# Patient Record
Sex: MALE | Race: WHITE | NOT HISPANIC OR LATINO | Employment: OTHER | URBAN - NONMETROPOLITAN AREA
[De-identification: names, ages, dates, MRNs, and addresses within clinical notes are randomized per-mention and may not be internally consistent; named-entity substitution may affect disease eponyms.]

---

## 2024-07-12 ENCOUNTER — OFFICE VISIT (OUTPATIENT)
Dept: UROLOGY | Facility: CLINIC | Age: 79
End: 2024-07-12
Payer: MEDICARE

## 2024-07-12 VITALS
DIASTOLIC BLOOD PRESSURE: 80 MMHG | WEIGHT: 220 LBS | HEART RATE: 99 BPM | OXYGEN SATURATION: 96 % | BODY MASS INDEX: 31.5 KG/M2 | HEIGHT: 70 IN | SYSTOLIC BLOOD PRESSURE: 110 MMHG

## 2024-07-12 DIAGNOSIS — R31.9 HEMATURIA, UNSPECIFIED TYPE: ICD-10-CM

## 2024-07-12 DIAGNOSIS — R39.9 LOWER URINARY TRACT SYMPTOMS (LUTS): Primary | ICD-10-CM

## 2024-07-12 LAB
BILIRUB BLD-MCNC: NEGATIVE MG/DL
CLARITY, POC: CLEAR
COLOR UR: ABNORMAL
EXPIRATION DATE: ABNORMAL
GLUCOSE UR STRIP-MCNC: NEGATIVE MG/DL
KETONES UR QL: NEGATIVE
LEUKOCYTE EST, POC: NEGATIVE
Lab: ABNORMAL
NITRITE UR-MCNC: NEGATIVE MG/ML
PH UR: 6.5 [PH] (ref 5–8)
PROT UR STRIP-MCNC: NEGATIVE MG/DL
RBC # UR STRIP: ABNORMAL /UL
SP GR UR: 1.02 (ref 1–1.03)
UROBILINOGEN UR QL: ABNORMAL

## 2024-07-12 RX ORDER — HYDROCHLOROTHIAZIDE 25 MG/1
TABLET ORAL
COMMUNITY

## 2024-07-12 RX ORDER — TAMSULOSIN HYDROCHLORIDE 0.4 MG/1
CAPSULE ORAL
COMMUNITY
Start: 2024-06-17

## 2024-07-12 RX ORDER — AMMONIUM LACTATE 12 G/100G
CREAM TOPICAL
COMMUNITY

## 2024-07-12 RX ORDER — ONDANSETRON 4 MG/1
TABLET, FILM COATED ORAL
COMMUNITY

## 2024-07-12 RX ORDER — FINASTERIDE 5 MG/1
TABLET, FILM COATED ORAL
COMMUNITY
Start: 2024-06-17

## 2024-07-12 RX ORDER — CETIRIZINE HYDROCHLORIDE 10 MG/1
TABLET ORAL
COMMUNITY
Start: 2024-06-17

## 2024-07-12 RX ORDER — METFORMIN HYDROCHLORIDE 500 MG/1
TABLET, EXTENDED RELEASE ORAL
COMMUNITY
Start: 2024-06-17

## 2024-07-12 RX ORDER — POLYETHYLENE GLYCOL 3350, SODIUM CHLORIDE, SODIUM BICARBONATE, POTASSIUM CHLORIDE 420; 11.2; 5.72; 1.48 G/4L; G/4L; G/4L; G/4L
POWDER, FOR SOLUTION ORAL
COMMUNITY

## 2024-07-12 RX ORDER — ASPIRIN 81 MG/1
81 TABLET ORAL
COMMUNITY

## 2024-07-12 RX ORDER — AMLODIPINE BESYLATE 10 MG/1
TABLET ORAL
COMMUNITY

## 2024-07-12 RX ORDER — LISINOPRIL 40 MG/1
TABLET ORAL
COMMUNITY

## 2024-07-12 RX ORDER — PRAVASTATIN SODIUM 10 MG
TABLET ORAL
COMMUNITY

## 2024-07-12 RX ORDER — AMOXICILLIN 500 MG/1
CAPSULE ORAL
COMMUNITY
End: 2024-07-12

## 2024-07-12 RX ORDER — MELATONIN
COMMUNITY
Start: 2024-06-05

## 2024-07-12 RX ORDER — POTASSIUM CHLORIDE 750 MG/1
TABLET, FILM COATED, EXTENDED RELEASE ORAL
COMMUNITY

## 2024-07-12 RX ORDER — METRONIDAZOLE 500 MG/1
TABLET ORAL
COMMUNITY
End: 2024-07-12

## 2024-07-12 RX ORDER — ATENOLOL 25 MG/1
TABLET ORAL
COMMUNITY

## 2024-07-12 RX ORDER — POLYMYXIN B SULFATE AND TRIMETHOPRIM 1; 10000 MG/ML; [USP'U]/ML
SOLUTION OPHTHALMIC
COMMUNITY
End: 2024-07-12

## 2024-07-12 RX ORDER — NAPROXEN 500 MG/1
TABLET ORAL
COMMUNITY

## 2024-07-12 NOTE — PATIENT INSTRUCTIONS
Recommended BPH workup.  Which includes cystoscopy to evaluate degree of prostate obstruction and bladder health.  TRUS (transrectal ultrasound) to measure the size of the prostate, and uroflow to evaluate stream strength.

## 2024-07-12 NOTE — PROGRESS NOTES
Office Visit Males LUTS      Patient Name: Ariel Wilkerson  : 1945   MRN: [id number]     Chief Complaint:   Chief Complaint   Patient presents with    [facility]     BPH W/LUTS       Referring Provider: Rodolfo Sharma DO    History of Present Illness: Ariel Wilkerson is a 79 y.o. male who presents today with history of chronic back pain, diabetes, gross hematuria and BPH with his most bothersome complaints being incomplete emptying, urgency, and nocturia.  He was previously treated by Dr. Sharma with recommendation to undergo TURP.  He is currently taking Flomax and Finasteride.  Most recent PSA in 2023 was 0.7 ng/mL, adjusted for finasteride use 1.4 ng/mL.  He is accompanied by his daughter today who is very supportive.  He reports that he cares for his wife with dementia. He is overall unhappy with his urinary symptoms and would be interested in proceeding with TURP if he is found to be a candidate.      IPSS Questionnaire (AUA-7):  Incomplete emptying  Over the past month, how often have you had a sensation of not emptying your bladder completely after you finished urinating?: Almost always (24)  Frequency  Over the past month, how often have you had to urinate again less than two hours after you finishied urinating ?: More than half the time (24)  Intermittency  Over the past month, how often have you found you stopped and started again several time when you urinated ?: Not at all (24)  Urgency  Over the last month, how often have you found it difficult  have you found it difficult to postpone urination ?: Almost always (24)  Weak Stream  Over the past month, how often have you had a weak urinary stream ?: About half the time (24)  Straining  Over the past month, how often have you had to push or strain to begin urination ?: Not at all (24)  Nocturia  Over the past month, how many times did you most typically get up to  urinate from the time you went to bed until the time you got up in the morning ?: At least 5 times (24)  Quality of life due to urinary symptoms  If you were to spend the rest of your life with your urinary condition the way it is now, how would feel about that?: Terrible (24)    Scores  Total IPSS Score: (!) 22 (24)  Total Score = Symptomatic Level: Severely symptomatic: 20-35 (24)       Subjective      Review of System: ROS reviewed by me and is negative unless otherwise noted in HPI.        Past Medical History:   Past Medical History:   Diagnosis Date    BPH (benign prostatic hyperplasia)     Gross hematuria        Past Surgical History:   Past Surgical History:   Procedure Laterality Date    CHOLECYSTECTOMY      HERNIA REPAIR         Family History:   Family History   Problem Relation Age of Onset    Heart disease Father     WILLIAM disease Mother     Other Brother         agent orange    Other Brother         MVA    Other Brother         unknown illness       Social History:   Social History     Socioeconomic History    Marital status:    Tobacco Use    Smoking status: Former     Current packs/day: 0.00     Average packs/day: 1 pack/day for 13.0 years (13.0 ttl pk-yrs)     Types: Cigarettes     Start date:      Quit date:      Years since quittin.5     Passive exposure: Past    Smokeless tobacco: Former     Types: Chew    Tobacco comments:     2 packs per week from  to    Vaping Use    Vaping status: Never Used   Substance and Sexual Activity    Alcohol use: Not Currently    Drug use: Never    Sexual activity: Not Currently     Partners: Female       Medications:     Current Outpatient Medications:     amLODIPine (NORVASC) 10 MG tablet, , Disp: , Rfl:     ammonium lactate (AMLACTIN) 12 % cream, , Disp: , Rfl:     aspirin 81 MG EC tablet, Take 1 tablet by mouth., Disp: , Rfl:     atenolol (TENORMIN) 25 MG tablet, , Disp: , Rfl:     cetirizine  "(zyrTEC) 10 MG tablet, , Disp: , Rfl:     Cholecalciferol 25 MCG (1000 UT) tablet, , Disp: , Rfl:     finasteride (PROSCAR) 5 MG tablet, , Disp: , Rfl:     hydroCHLOROthiazide 25 MG tablet, , Disp: , Rfl:     KLOR-CON 10 MEQ CR tablet, , Disp: , Rfl:     lisinopril (PRINIVIL,ZESTRIL) 40 MG tablet, , Disp: , Rfl:     metFORMIN ER (GLUCOPHAGE-XR) 500 MG 24 hr tablet, , Disp: , Rfl:     naproxen (NAPROSYN) 500 MG tablet, , Disp: , Rfl:     ondansetron (ZOFRAN) 4 MG tablet, , Disp: , Rfl:     polyethylene glycol-electrolytes (NULYTELY) 420 g solution, , Disp: , Rfl:     pravastatin (PRAVACHOL) 10 MG tablet, , Disp: , Rfl:     tamsulosin (FLOMAX) 0.4 MG capsule 24 hr capsule, , Disp: , Rfl:     Allergies:   Allergies   Allergen Reactions    Iodinated Contrast Media Hives    Nifedipine Hives and Rash       Objective     Physical Exam:   Vital Signs:   Vitals:    07/12/24 1402   BP: 110/80   Pulse: 99   SpO2: 96%   Weight: 99.8 kg (220 lb)   Height: 177.8 cm (70\")     Body mass index is 31.57 kg/m².   Physical Exam  Vitals and nursing note reviewed.   Constitutional:       General: He is not in acute distress.     Appearance: Normal appearance. He is ill-appearing (chronically ill appearing).   HENT:      Head: Normocephalic and atraumatic.   Pulmonary:      Effort: Pulmonary effort is normal.   Skin:     General: Skin is warm and dry.   Neurological:      General: No focal deficit present.      Mental Status: He is alert and oriented to person, place, and time.      Gait: Gait abnormal (ambulates with cane, slow position changes).   Psychiatric:         Mood and Affect: Mood normal.         Behavior: Behavior normal.          Labs  No results found for: \"PSA\"    Brief Urine Lab Results  (Last result in the past 365 days)        Color   Clarity   Blood   Leuk Est   Nitrite   Protein   CREAT   Urine HCG        07/12/24 1411 Rosmery   Clear   Trace   Negative   Negative   Negative                   PVR  Post-void residual " performed by staff - 18 ml.       Assessment / Plan      Assessment    Ariel Wilkerson is a 79 y.o. male who presents today with history of chronic back pain, diabetes, gross hematuria and BPH with his most bothersome complaints being incomplete emptying, urgency, and nocturia.  He was previously treated by Dr. Sharma with recommendation to undergo TURP.  He is currently taking Flomax and Finasteride.  Most recent PSA in June of 2023 was 0.7 ng/mL, adjusted for finasteride use 1.4 ng/mL.      We discussed the BPH work up with both the patient and his daughter.  Mr. Wilkerson is agreeable to undergo cystoscopy, TRUS, and uroflow as discussed.  PSA was within normal limits 1 year ago and patient has elected to no longer have screening PSAs performed with discussion today.  PVR was 18 mls. UA was benign aside from trace blood.  Urine microscopy ordered today.  Will contact Mr. Wilkerson when results become available.      Questions and concerns addressed to patient's and his daughters satisfaction today. He wishes to proceed with BPH work up.       Diagnoses and all orders for this visit:    1. Lower urinary tract symptoms (LUTS) (Primary)  -     POC Urinalysis Dipstick, Automated  -     Urinalysis With Microscopic - Urine, Clean Catch    2. Hematuria, unspecified type  -     POC Urinalysis Dipstick, Automated  -     Urinalysis With Microscopic - Urine, Clean Catch          Follow Up:   Return for with Dr. Hodgson for cystoscopy, TRUS, and uroflow .      Stephanie Leyva, APRN, MSN, FNP-C  [facility]

## 2024-07-16 LAB
APPEARANCE UR: CLEAR
BACTERIA #/AREA URNS HPF: NORMAL /HPF
BILIRUB UR QL STRIP: NEGATIVE
CASTS URNS MICRO: NORMAL
COLOR UR: YELLOW
EPI CELLS #/AREA URNS HPF: NORMAL /HPF
GLUCOSE UR QL STRIP: NEGATIVE
HGB UR QL STRIP: NEGATIVE
KETONES UR QL STRIP: NEGATIVE
LEUKOCYTE ESTERASE UR QL STRIP: NEGATIVE
NITRITE UR QL STRIP: NEGATIVE
PH UR STRIP: 6.5 [PH] (ref 5–8)
PROT UR QL STRIP: ABNORMAL
RBC #/AREA URNS HPF: NORMAL /HPF
SP GR UR STRIP: 1.02 (ref 1–1.03)
UROBILINOGEN UR STRIP-MCNC: ABNORMAL MG/DL
WBC #/AREA URNS HPF: NORMAL /HPF

## 2024-09-12 ENCOUNTER — PROCEDURE VISIT (OUTPATIENT)
Dept: UROLOGY | Facility: CLINIC | Age: 79
End: 2024-09-12
Payer: MEDICARE

## 2024-09-12 VITALS — BODY MASS INDEX: 31.57 KG/M2 | WEIGHT: 220 LBS

## 2024-09-12 DIAGNOSIS — N39.41 URGE INCONTINENCE OF URINE: ICD-10-CM

## 2024-09-12 DIAGNOSIS — N40.1 BPH WITH URINARY OBSTRUCTION: ICD-10-CM

## 2024-09-12 DIAGNOSIS — R31.9 HEMATURIA, UNSPECIFIED TYPE: Primary | ICD-10-CM

## 2024-09-12 DIAGNOSIS — N13.8 BPH WITH URINARY OBSTRUCTION: ICD-10-CM

## 2024-09-12 DIAGNOSIS — R39.9 LOWER URINARY TRACT SYMPTOMS (LUTS): ICD-10-CM

## 2024-09-12 RX ORDER — SODIUM CHLORIDE 9 MG/ML
100 INJECTION, SOLUTION INTRAVENOUS CONTINUOUS
OUTPATIENT
Start: 2024-09-12

## 2024-09-12 RX ORDER — SODIUM CHLORIDE 0.9 % (FLUSH) 0.9 %
3 SYRINGE (ML) INJECTION EVERY 12 HOURS SCHEDULED
OUTPATIENT
Start: 2024-09-12

## 2024-09-12 RX ORDER — SODIUM CHLORIDE 0.9 % (FLUSH) 0.9 %
3-10 SYRINGE (ML) INJECTION AS NEEDED
OUTPATIENT
Start: 2024-09-12

## 2024-09-12 RX ORDER — SODIUM CHLORIDE 9 MG/ML
40 INJECTION, SOLUTION INTRAVENOUS AS NEEDED
OUTPATIENT
Start: 2024-09-12

## 2024-09-12 NOTE — PROGRESS NOTES
"CC  LUTS / BPH Workup    HPI  Ms. Wilkerson is a 79 y.o. male with history below in assessment, who presents for follow up.     At this visit patient is here for BPH workup and discussion.     Past Medical History:   Diagnosis Date    BPH (benign prostatic hyperplasia)     Diabetes     Gross hematuria     High cholesterol     Hypertension        Past Surgical History:   Procedure Laterality Date    CHOLECYSTECTOMY      HERNIA REPAIR           Current Outpatient Medications:     amLODIPine (NORVASC) 10 MG tablet, , Disp: , Rfl:     ammonium lactate (AMLACTIN) 12 % cream, , Disp: , Rfl:     aspirin 81 MG EC tablet, Take 1 tablet by mouth., Disp: , Rfl:     atenolol (TENORMIN) 25 MG tablet, , Disp: , Rfl:     cetirizine (zyrTEC) 10 MG tablet, , Disp: , Rfl:     Cholecalciferol 25 MCG (1000 UT) tablet, , Disp: , Rfl:     finasteride (PROSCAR) 5 MG tablet, , Disp: , Rfl:     hydroCHLOROthiazide 25 MG tablet, , Disp: , Rfl:     KLOR-CON 10 MEQ CR tablet, , Disp: , Rfl:     lisinopril (PRINIVIL,ZESTRIL) 40 MG tablet, , Disp: , Rfl:     metFORMIN ER (GLUCOPHAGE-XR) 500 MG 24 hr tablet, , Disp: , Rfl:     naproxen (NAPROSYN) 500 MG tablet, , Disp: , Rfl:     ondansetron (ZOFRAN) 4 MG tablet, , Disp: , Rfl:     polyethylene glycol-electrolytes (NULYTELY) 420 g solution, , Disp: , Rfl:     pravastatin (PRAVACHOL) 10 MG tablet, , Disp: , Rfl:     tamsulosin (FLOMAX) 0.4 MG capsule 24 hr capsule, , Disp: , Rfl:      Physical Exam  There were no vitals taken for this visit.    Labs  Brief Urine Lab Results  (Last result in the past 365 days)        Color   Clarity   Blood   Leuk Est   Nitrite   Protein   CREAT   Urine HCG        07/15/24 0000 Yellow  Comment: REFERENCE RANGE: Yellow, Straw   Clear   Negative   Negative   Negative   Trace                   No results found for: \"GLUCOSE\", \"CALCIUM\", \"NA\", \"K\", \"CO2\", \"CL\", \"BUN\", \"CREATININE\", \"EGFRIFAFRI\", \"EGFRIFNONA\", \"BCR\", \"ANIONGAP\"    No results found for: \"WBC\", \"HGB\", \"HCT\", " "\"MCV\", \"PLT\"         No results found for: \"PSA\"    Radiographic Studies  XR Hips Bilateral With or Without Pelvis 2 View    Result Date: 9/3/2024  Degenerative change and joint effusion. No acute fracture. BILATERAL HIP SERIES HISTORY: Bilateral hip pain, fall. COMPARISON: None FINDINGS: 3 views show no evidence of an acute, displaced fracture or dislocation of the visualized bony architecture. There are mild hypertrophic changes along the acetabular margins consistent with mild osteoarthritis. No soft tissue abnormality is seen. IMPRESSION: Mild osteoarthritis. No acute fracture. Images reviewed, interpreted, and dictated by Dr. Scott Silverman. Transcribed by Yoandy Virgen PA-C.    XR KNEE 4 VIEWS RIGHT Non-Weight Bearing    Result Date: 9/3/2024  Degenerative change and joint effusion. No acute fracture. BILATERAL HIP SERIES HISTORY: Bilateral hip pain, fall. COMPARISON: None FINDINGS: 3 views show no evidence of an acute, displaced fracture or dislocation of the visualized bony architecture. There are mild hypertrophic changes along the acetabular margins consistent with mild osteoarthritis. No soft tissue abnormality is seen. IMPRESSION: Mild osteoarthritis. No acute fracture. Images reviewed, interpreted, and dictated by Dr. Scott Silverman. Transcribed by Yoandy Virgen PA-C.      I have reviewed above labs and imaging.     CYSTOSCOPY  Preprocedure diagnosis  LUTS  Postprocedure diagnosis  Same  Procedure  Flexible Cystourethroscopy  Attending surgeon  Alexander Hodgson MD  Anesthesia  2% lidocaine jelly intraurethrally  Complications  None  Indications  79 y.o. male undergoing a flexible cystoscopy for the above mentioned indications.  Informed consent was obtained.    Findings  Cystoscopic findings included one right and left ureteral orifice in the normal anatomic position with normal bladder mucosa and no tumors, masses or stones. The urethral urothelium was within normal limits with no strictures.  There " was not a prominent median lobe.  The lateral lobes were large and obstructive in appearance.  Significant evidence of bladder damage/trabeculations and diverticula  Procedure  The patient was placed in supine position and prepped and draped in sterile fashion with lidocaine jelly per urethra for anesthesia.  A timeout was performed.  The 14F flexible cystoscope was lubricated and gently placed through the penile urethra and into the bladder.  The bladder was completely visualized.  The cystoscope was retroflexed and the bladder neck and prostate visualized.  The cystoscope was slowly withdrawn while visualizing the urethra and the procedure terminated.  The patient tolerated the procedure well.      TRUS OF PROSTATE   Preoperative diagnosis  LUTS  Postoperative diagnosis  Same  Procedure  1.  Transrectal ultrasound of the prostate  Attending Surgeon  Alexander Hodgson MD  Anesthesia  2% lidocaine jelly, intrarectal instillation, 10mL  Complications  None  Specimen  None  Indications  Mr. Wilkerson is a 79 y.o. male with LUTS.  He presents for prostate ultrasound to evaluate prostate size.  Procedure  The patient was positioned and prepped in a left lateral position with lower extremities flexed.  Lidocaine jelly, 2%, was injected per rectum. A digital rectal exam was performed which demonstrated a smooth prostate without nodules or induration. The [employer] E8CS rectal ultrasound probe was slowly introduced into the rectum without difficulty.  The prostate and seminal vesicles were inspected systematically using cross and sagittal views with the ultrasound. The dimensions of the prostate were measured, for a calculated volume of 33 mL with 4.5 cm prostatic width.  The seminal vesicles appeared normal.  The rectal ultrasound probe was removed.  The patient tolerated the procedure well.    UROFLOW  His uroflow was scanned in the chart and his stream was very weak low long and flat.    I personally reviewed  and interpreted this  study.       Assessment  79 y.o. male with worsening of chronic lower urinary tract symptoms. History of chronic back pain, diabetes, gross hematuria and BPH with his most bothersome complaints being incomplete emptying, urgency, UUI, and nocturia.  He was previously treated by Dr. Sharma with recommendation to undergo TURP.  He is currently taking Flomax and Finasteride.  Most recent PSA in June of 2023 was 0.7 ng/mL     In a separate room and encounter after his workup, we discussed treatment for his severe lower urinary tract symptoms.  Most bothered by urge incontinence and very weak stream.  We discussed the risk, benefits, and alternatives to HoLEP.  He voiced understanding wish to proceed.    Plan  1. Schedule for HoLEP plus Botox in November.  Risks are diabetes mellitus

## 2024-10-22 ENCOUNTER — TELEPHONE (OUTPATIENT)
Dept: UROLOGY | Facility: CLINIC | Age: 79
End: 2024-10-22
Payer: MEDICARE

## 2024-10-22 NOTE — TELEPHONE ENCOUNTER
Notified patient that his surgery with Dr. Alexander Hodgson scheduled 11/27/2024 has been cancelled due to a shortage of IV fluids.

## 2025-05-14 ENCOUNTER — TELEPHONE (OUTPATIENT)
Dept: UROLOGY | Facility: CLINIC | Age: 80
End: 2025-05-14
Payer: MEDICARE

## 2025-05-14 NOTE — TELEPHONE ENCOUNTER
Caller: REJI GUZMÁN    Relationship to patient: DAUGHTER    Best call back number: [phone]    Chief complaint: CALLED TO LET US KNOW THAT HER FATHER IS CURRENTLY IN THE HOSPITAL WITH CHF, A FIB AND FLUTTER. THEY DO NOT BELIEVE HE WILL BE IN ANY CONDITION TO HAVE SURGERY AT THE END OF THE MONTH AND ARE WISHING TO CANCEL THIS FOR NOW.    QUESTIONS PLEASE REACH OUT TO REJI

## 2025-05-21 ENCOUNTER — TELEPHONE (OUTPATIENT)
Dept: UROLOGY | Facility: CLINIC | Age: 80
End: 2025-05-21
Payer: MEDICARE

## 2025-05-21 NOTE — TELEPHONE ENCOUNTER
Spoke to patient's daughter, she stated that he sees his cardiologist in about a month and she will discuss with his provider how soon he can have a HoLEP., and call me.

## 2025-07-23 ENCOUNTER — TELEPHONE (OUTPATIENT)
Dept: UROLOGY | Facility: CLINIC | Age: 80
End: 2025-07-23